# Patient Record
Sex: FEMALE | Race: ASIAN | NOT HISPANIC OR LATINO
[De-identification: names, ages, dates, MRNs, and addresses within clinical notes are randomized per-mention and may not be internally consistent; named-entity substitution may affect disease eponyms.]

---

## 2017-12-09 ENCOUNTER — APPOINTMENT (OUTPATIENT)
Dept: PLASTIC SURGERY | Facility: CLINIC | Age: 67
End: 2017-12-09
Payer: MEDICARE

## 2017-12-09 PROCEDURE — 99204 OFFICE O/P NEW MOD 45 MIN: CPT | Mod: 25

## 2017-12-09 PROCEDURE — 11900 INJECT SKIN LESIONS </W 7: CPT

## 2018-01-06 ENCOUNTER — APPOINTMENT (OUTPATIENT)
Dept: PLASTIC SURGERY | Facility: CLINIC | Age: 68
End: 2018-01-06
Payer: MEDICARE

## 2018-01-06 PROCEDURE — 99214 OFFICE O/P EST MOD 30 MIN: CPT | Mod: 25

## 2018-01-06 PROCEDURE — 11900 INJECT SKIN LESIONS </W 7: CPT

## 2018-02-10 ENCOUNTER — APPOINTMENT (OUTPATIENT)
Dept: PLASTIC SURGERY | Facility: CLINIC | Age: 68
End: 2018-02-10
Payer: MEDICARE

## 2018-02-10 PROCEDURE — 99213 OFFICE O/P EST LOW 20 MIN: CPT | Mod: 25

## 2018-02-10 PROCEDURE — 11900 INJECT SKIN LESIONS </W 7: CPT

## 2018-03-24 ENCOUNTER — APPOINTMENT (OUTPATIENT)
Dept: PLASTIC SURGERY | Facility: CLINIC | Age: 68
End: 2018-03-24
Payer: MEDICARE

## 2018-03-24 PROCEDURE — 99213 OFFICE O/P EST LOW 20 MIN: CPT | Mod: 25

## 2018-03-24 PROCEDURE — 11900 INJECT SKIN LESIONS </W 7: CPT

## 2018-04-21 ENCOUNTER — APPOINTMENT (OUTPATIENT)
Dept: PLASTIC SURGERY | Facility: CLINIC | Age: 68
End: 2018-04-21
Payer: MEDICARE

## 2018-04-21 PROCEDURE — 99213 OFFICE O/P EST LOW 20 MIN: CPT | Mod: 25

## 2018-04-21 PROCEDURE — 11900 INJECT SKIN LESIONS </W 7: CPT

## 2018-05-19 ENCOUNTER — APPOINTMENT (OUTPATIENT)
Dept: PLASTIC SURGERY | Facility: CLINIC | Age: 68
End: 2018-05-19
Payer: MEDICARE

## 2018-05-19 PROCEDURE — 11900 INJECT SKIN LESIONS </W 7: CPT

## 2018-05-19 PROCEDURE — 99213 OFFICE O/P EST LOW 20 MIN: CPT | Mod: 25

## 2018-06-16 ENCOUNTER — APPOINTMENT (OUTPATIENT)
Dept: PLASTIC SURGERY | Facility: CLINIC | Age: 68
End: 2018-06-16
Payer: MEDICARE

## 2018-06-20 ENCOUNTER — APPOINTMENT (OUTPATIENT)
Dept: PLASTIC SURGERY | Facility: CLINIC | Age: 68
End: 2018-06-20
Payer: MEDICARE

## 2018-06-20 VITALS
HEART RATE: 76 BPM | BODY MASS INDEX: 19.8 KG/M2 | SYSTOLIC BLOOD PRESSURE: 106 MMHG | WEIGHT: 88 LBS | DIASTOLIC BLOOD PRESSURE: 72 MMHG | OXYGEN SATURATION: 99 % | HEIGHT: 56 IN

## 2018-06-20 DIAGNOSIS — R52 PAIN, UNSPECIFIED: ICD-10-CM

## 2018-06-20 DIAGNOSIS — L90.5 PAIN, UNSPECIFIED: ICD-10-CM

## 2018-06-20 DIAGNOSIS — M62.48: ICD-10-CM

## 2018-06-20 PROCEDURE — 99213 OFFICE O/P EST LOW 20 MIN: CPT

## 2018-06-23 PROBLEM — M62.48: Status: ACTIVE | Noted: 2018-06-23

## 2018-06-23 PROBLEM — R52 PAINFUL SCAR: Status: ACTIVE | Noted: 2018-06-23

## 2018-12-08 ENCOUNTER — APPOINTMENT (OUTPATIENT)
Dept: PLASTIC SURGERY | Facility: CLINIC | Age: 68
End: 2018-12-08
Payer: MEDICARE

## 2018-12-08 PROCEDURE — ZZZZZ: CPT

## 2019-02-16 ENCOUNTER — APPOINTMENT (OUTPATIENT)
Dept: PLASTIC SURGERY | Facility: CLINIC | Age: 69
End: 2019-02-16
Payer: MEDICARE

## 2019-02-16 DIAGNOSIS — M95.2 OTHER ACQUIRED DEFORMITY OF HEAD: ICD-10-CM

## 2019-02-16 PROCEDURE — 99212 OFFICE O/P EST SF 10 MIN: CPT

## 2019-02-18 PROBLEM — M95.2 ACQUIRED DEFORMITY OF FACE: Status: ACTIVE | Noted: 2017-12-19

## 2019-02-18 NOTE — ADDENDUM
[FreeTextEntry1] : All medical record entries made were at my, JAKUB OSHEA MD, direction and personally dictated by me. I have reviewed the chart and agree that the record accurately reflects my personal performance of the history, physical exam, assessment, and plan.

## 2019-02-18 NOTE — PHYSICAL EXAM
[de-identified] : The patient is ambulatory, well groomed, no signs of cachexia.\par  [de-identified] : Normocephalic, atraumatic.\par  [de-identified] : No conjunctival erythema, hyperemia, or discharge bilaterally; no ectropion, entropion, lagophthalmos, or lid malposition bilaterally. Pupils are equal, round, and reactive to light bilaterally.\par  [de-identified] : There are no masses, scars, or lesions of the external ear. External nose is midline with no scars or masses. Gross hearing intact bilaterally (finger rub). Nasal mucosa has no edema, lesions, or signs of desiccation. Lips and gums have no masses, lesions, friability, or edema.\par  [de-identified] : Neck is soft without edema, masses, or crepitus. Trachea midline. No thyromegaly; no palpable thyroid nodules.\par  [de-identified] : No sign of respiratory distress, no tachypnea, no use of accessory respiratory muscles.\par  [de-identified] : No swelling, tenderness, or varicosities of the extremities bilaterally; extremities are warm to palpation and pedal pulses intact.\par  [de-identified] : No hepatomegaly or splenomegaly. No abdominal wall tenderness or masses on palpation. No abdominal wall hernias noted.\par  [de-identified] : On examination, patient has normal gait and station. [de-identified] : Right cheek with well healed skin graft, evidence of hypertrophic scarring as well as contracture which is pulling her lower lip causing deformity. \par hypertrophic scarring at the right lip corner is slightly improved following Kenalog injections,\par at the superio-medial aspect of the skin graft scar there is a patch of discoloration and callus - like formation with chronic granulation tissue/inflammed tissue noted in the center, appears to be no drainage or any erythema, \par Scarring on the right commissure. Fullness over the malar area. No open wound, no erythema, no drainage.\par Facial nerve appears to be symmetric; intraoral exam w/ no open areas. \par  [de-identified] : CN 2-12 are intact, no sensory disturbances noted (e.g. by touch)\par  [de-identified] : The patient is alert and oriented to time, place, and person. No obvious disorder of mood or affect such as anxiety or agitation.\par

## 2019-02-18 NOTE — HISTORY OF PRESENT ILLNESS
[FreeTextEntry1] : This is a 67 yo F who presents for follow up visit regarding the right facial deformity. The patient states that following her previous visit, she has stopped picking at her right cheek scar/wound and only applied a "k[place] cream" daily. She thinks that the scar has gotten better with the use of this cream. Pt continues to have the same symptoms in regards to her lips; she c/o that the right lip corner does not come together making it very difficult for her to eat or drink liquids. Patient is here for further evaluation. She denies redness or drainage from the area. Otherwise, no other complaints or concerns; denies fever, sweats, or chills.\par \par 12/09/2017:\par Patient states that she had illegal filler injection done 5 years ago to the right cheek that left a necrotic wound. She had wound care at [facility] for the following 1 year afterwards with Dr. Júnior Chen; she then had a skin graft done with fat grafting with Dr. Chen. Her last fat grafting was done approximately 2.5 years ago and she had a scar revision 1.5 years ago. 3 months ago, she saw a general surgeon for revision of her scar. She was not satisfied with the result and cut the sutures the next day which resulted in her bottom lip to droop outward.\par

## 2019-02-18 NOTE — ASSESSMENT
[FreeTextEntry1] : 67 yo F who presents for follow up visit regarding the right facial deformity. \par \par Right Facial Deformity; wound has increased in size since last visit with callus like formation.\par \par - Recommended Dermatologic evaluation with Dr. Shorty Galloway for biopsy of the area of concern.\par - Refrain from excoriation of the area/do not pick at area\par - D/C Cream \par - Follow up after Dermatologic evaluation.